# Patient Record
Sex: MALE | Race: WHITE | HISPANIC OR LATINO | URBAN - METROPOLITAN AREA
[De-identification: names, ages, dates, MRNs, and addresses within clinical notes are randomized per-mention and may not be internally consistent; named-entity substitution may affect disease eponyms.]

---

## 2021-02-08 ENCOUNTER — OFFICE VISIT (OUTPATIENT)
Dept: URBAN - METROPOLITAN AREA CLINIC 63 | Facility: CLINIC | Age: 36
End: 2021-02-08

## 2021-04-15 ENCOUNTER — OFFICE VISIT (OUTPATIENT)
Dept: URBAN - METROPOLITAN AREA SURGERY CENTER 4 | Facility: SURGERY CENTER | Age: 36
End: 2021-04-15

## 2021-04-28 ENCOUNTER — OFFICE VISIT (OUTPATIENT)
Dept: URBAN - METROPOLITAN AREA CLINIC 63 | Facility: CLINIC | Age: 36
End: 2021-04-28

## 2021-06-24 ENCOUNTER — OFFICE VISIT (OUTPATIENT)
Dept: URBAN - METROPOLITAN AREA SURGERY CENTER 4 | Facility: SURGERY CENTER | Age: 36
End: 2021-06-24

## 2021-06-28 LAB — PATHOLOGY (INDENTED REPORT): (no result)

## 2021-07-16 ENCOUNTER — OFFICE VISIT (OUTPATIENT)
Dept: URBAN - METROPOLITAN AREA CLINIC 63 | Facility: CLINIC | Age: 36
End: 2021-07-16

## 2021-08-18 ENCOUNTER — OFFICE VISIT (OUTPATIENT)
Dept: URBAN - METROPOLITAN AREA CLINIC 60 | Facility: CLINIC | Age: 36
End: 2021-08-18

## 2021-10-06 ENCOUNTER — OFFICE VISIT (OUTPATIENT)
Dept: URBAN - METROPOLITAN AREA CLINIC 63 | Facility: CLINIC | Age: 36
End: 2021-10-06

## 2021-10-22 ENCOUNTER — OFFICE VISIT (OUTPATIENT)
Dept: URBAN - METROPOLITAN AREA SURGERY CENTER 4 | Facility: SURGERY CENTER | Age: 36
End: 2021-10-22

## 2022-01-21 ENCOUNTER — OFFICE VISIT (OUTPATIENT)
Dept: URBAN - METROPOLITAN AREA SURGERY CENTER 4 | Facility: SURGERY CENTER | Age: 37
End: 2022-01-21

## 2022-04-08 ENCOUNTER — OFFICE VISIT (OUTPATIENT)
Dept: URBAN - METROPOLITAN AREA SURGERY CENTER 4 | Facility: SURGERY CENTER | Age: 37
End: 2022-04-08

## 2022-06-23 ENCOUNTER — OFFICE VISIT (OUTPATIENT)
Dept: URBAN - METROPOLITAN AREA SURGERY CENTER 4 | Facility: SURGERY CENTER | Age: 37
End: 2022-06-23

## 2022-06-24 ENCOUNTER — OFFICE VISIT (OUTPATIENT)
Dept: URBAN - METROPOLITAN AREA SURGERY CENTER 4 | Facility: SURGERY CENTER | Age: 37
End: 2022-06-24

## 2022-07-09 ENCOUNTER — TELEPHONE ENCOUNTER (OUTPATIENT)
Dept: URBAN - METROPOLITAN AREA CLINIC 121 | Facility: CLINIC | Age: 37
End: 2022-07-09

## 2022-07-09 RX ORDER — LEVOTHYROXINE SODIUM 50 UG/1
TABLET ORAL ONCE A DAY
Refills: 0 | OUTPATIENT
Start: 2020-12-12 | End: 2021-10-06

## 2022-07-10 ENCOUNTER — TELEPHONE ENCOUNTER (OUTPATIENT)
Dept: URBAN - METROPOLITAN AREA CLINIC 121 | Facility: CLINIC | Age: 37
End: 2022-07-10

## 2022-07-10 RX ORDER — LEVOTHYROXINE SODIUM 50 UG/1
TABLET ORAL ONCE A DAY
Refills: 0 | Status: ACTIVE | COMMUNITY
Start: 2021-10-06

## 2022-07-10 RX ORDER — MESALAMINE 1000 MG/1
ONCE A DAY SUPPOSITORY RECTAL ONCE A DAY
Refills: 0 | Status: ACTIVE | COMMUNITY
Start: 2021-07-16

## 2022-07-29 ENCOUNTER — TELEPHONE ENCOUNTER (OUTPATIENT)
Dept: URBAN - METROPOLITAN AREA CLINIC 63 | Facility: CLINIC | Age: 37
End: 2022-07-29

## 2022-09-27 ENCOUNTER — OFFICE VISIT (OUTPATIENT)
Dept: URBAN - METROPOLITAN AREA CLINIC 63 | Facility: CLINIC | Age: 37
End: 2022-09-27

## 2022-09-27 RX ORDER — MESALAMINE 1000 MG/1
ONCE A DAY SUPPOSITORY RECTAL ONCE A DAY
Refills: 0 | Status: ACTIVE | COMMUNITY
Start: 2021-07-16

## 2022-09-27 RX ORDER — LEVOTHYROXINE SODIUM 50 UG/1
TABLET ORAL ONCE A DAY
Refills: 0 | Status: ACTIVE | COMMUNITY
Start: 2021-10-06

## 2022-12-06 ENCOUNTER — TELEPHONE ENCOUNTER (OUTPATIENT)
Dept: URBAN - METROPOLITAN AREA CLINIC 63 | Facility: CLINIC | Age: 37
End: 2022-12-06

## 2022-12-07 ENCOUNTER — LAB OUTSIDE AN ENCOUNTER (OUTPATIENT)
Dept: URBAN - METROPOLITAN AREA CLINIC 63 | Facility: CLINIC | Age: 37
End: 2022-12-07

## 2022-12-07 ENCOUNTER — WEB ENCOUNTER (OUTPATIENT)
Dept: URBAN - METROPOLITAN AREA CLINIC 63 | Facility: CLINIC | Age: 37
End: 2022-12-07

## 2022-12-07 ENCOUNTER — OFFICE VISIT (OUTPATIENT)
Dept: URBAN - METROPOLITAN AREA CLINIC 63 | Facility: CLINIC | Age: 37
End: 2022-12-07
Payer: COMMERCIAL

## 2022-12-07 VITALS
SYSTOLIC BLOOD PRESSURE: 124 MMHG | OXYGEN SATURATION: 97 % | BODY MASS INDEX: 38.89 KG/M2 | HEIGHT: 67 IN | TEMPERATURE: 97.2 F | WEIGHT: 247.8 LBS | DIASTOLIC BLOOD PRESSURE: 82 MMHG | HEART RATE: 78 BPM

## 2022-12-07 DIAGNOSIS — D12.6 BENIGN NEOPLASM OF COLON, UNSPECIFIED: ICD-10-CM

## 2022-12-07 DIAGNOSIS — E66.9 OBESITY, UNSPECIFIED: ICD-10-CM

## 2022-12-07 DIAGNOSIS — K76.0 FATTY LIVER: ICD-10-CM

## 2022-12-07 PROBLEM — 197321007: Status: ACTIVE | Noted: 2022-12-07

## 2022-12-07 PROCEDURE — 99213 OFFICE O/P EST LOW 20 MIN: CPT | Performed by: INTERNAL MEDICINE

## 2022-12-07 RX ORDER — LEVOTHYROXINE SODIUM 50 UG/1
TABLET ORAL ONCE A DAY
Refills: 0 | Status: ACTIVE | COMMUNITY
Start: 2021-10-06

## 2022-12-07 NOTE — HPI-HPI
Patient with rectal bleeding and irritation of the hemorrhoids, does not complains of change in his stool. is red blood, denies rectal pain. Patient works in the [employer]. Will start topical treatment will consider flex sigmoidoscopy. Symptoms worse since last summer.   7/21 Patient sigmoidoscopy shows evidence of a 5 mm tubular adenoma polyp in the rectosigmoid colon, mild diverticular disease of the descending colon, and internal hemorrhoids, and congested and erythematous mucosa in the sigmoid colon.  Biopsy results shows evidence of no evidence of microscopic colitis.  Patient here today in good general state.  Still complaining of rectal bleeding at times and rectal discomfort. Plan: Patient will start on Canasa suppository once at night.  Will increase fluid and fiber intake avoid constipation. 10/21: Patient with treatment with canasa with improvement of the symptoms, is on treatment doing well. Likely topical treatment is improved the rectal symptoms. With adenoma will do colonoscopy to evaluate the rest of the colon, patient would like to wait few months. Will follow as needed basis. 12/22: Patient had colonoscopy on JUne 2022 with evidence of transverse colon polyps, Tubular adenoma. Will plan a surveilance colonoscopy in 3 years. Patient  is doing better, with rare hemorrhoids irritation.  Will continue treatment topical for hemorrhoids and increase fiber and  water. Will follow as needed basis and will do try to decrease weight.

## 2023-03-15 ENCOUNTER — OFFICE VISIT (OUTPATIENT)
Dept: URBAN - METROPOLITAN AREA CLINIC 63 | Facility: CLINIC | Age: 38
End: 2023-03-15

## 2023-12-15 ENCOUNTER — TELEPHONE ENCOUNTER (OUTPATIENT)
Dept: URBAN - METROPOLITAN AREA CLINIC 63 | Facility: CLINIC | Age: 38
End: 2023-12-15

## 2023-12-15 ENCOUNTER — LAB OUTSIDE AN ENCOUNTER (OUTPATIENT)
Dept: URBAN - METROPOLITAN AREA CLINIC 63 | Facility: CLINIC | Age: 38
End: 2023-12-15

## 2024-04-17 ENCOUNTER — OV EP (OUTPATIENT)
Dept: URBAN - METROPOLITAN AREA CLINIC 63 | Facility: CLINIC | Age: 39
End: 2024-04-17
Payer: COMMERCIAL

## 2024-04-17 ENCOUNTER — OV EP (OUTPATIENT)
Dept: URBAN - METROPOLITAN AREA CLINIC 63 | Facility: CLINIC | Age: 39
End: 2024-04-17

## 2024-04-17 VITALS
HEIGHT: 67 IN | SYSTOLIC BLOOD PRESSURE: 140 MMHG | HEART RATE: 90 BPM | DIASTOLIC BLOOD PRESSURE: 90 MMHG | BODY MASS INDEX: 37.35 KG/M2 | OXYGEN SATURATION: 96 % | WEIGHT: 238 LBS | TEMPERATURE: 97.7 F

## 2024-04-17 DIAGNOSIS — E78.5 DYSLIPIDEMIA: ICD-10-CM

## 2024-04-17 DIAGNOSIS — E66.9 OBESITY, UNSPECIFIED: ICD-10-CM

## 2024-04-17 DIAGNOSIS — K76.0 FATTY LIVER: ICD-10-CM

## 2024-04-17 DIAGNOSIS — D12.6 BENIGN NEOPLASM OF COLON, UNSPECIFIED: ICD-10-CM

## 2024-04-17 PROCEDURE — 99214 OFFICE O/P EST MOD 30 MIN: CPT | Performed by: INTERNAL MEDICINE

## 2024-04-17 RX ORDER — LEVOTHYROXINE SODIUM 50 UG/1
TABLET ORAL ONCE A DAY
Refills: 0 | Status: ACTIVE | COMMUNITY
Start: 2021-10-06

## 2024-04-17 NOTE — HPI-HPI
Patient with rectal bleeding and irritation of the hemorrhoids, does not complains of change in his stool. is red blood, denies rectal pain. Patient works in the [employer]. Will start topical treatment will consider flex sigmoidoscopy. Symptoms worse since last summer.   7/21 Patient sigmoidoscopy shows evidence of a 5 mm tubular adenoma polyp in the rectosigmoid colon, mild diverticular disease of the descending colon, and internal hemorrhoids, and congested and erythematous mucosa in the sigmoid colon.  Biopsy results shows evidence of no evidence of microscopic colitis.  Patient here today in good general state.  Still complaining of rectal bleeding at times and rectal discomfort. Plan: Patient will start on Canasa suppository once at night.  Will increase fluid and fiber intake avoid constipation. 10/21: Patient with treatment with canasa with improvement of the symptoms, is on treatment doing well. Likely topical treatment is improved the rectal symptoms. With adenoma will do colonoscopy to evaluate the rest of the colon, patient would like to wait few months. Will follow as needed basis. 12/22: Patient had colonoscopy on JUne 2022 with evidence of transverse colon polyps, Tubular adenoma. Will plan a surveilance colonoscopy in 3 years. Patient  is doing better, with rare hemorrhoids irritation.  Will continue treatment topical for hemorrhoids and increase fiber and  water. Will follow as needed basis and will do try to decrease weight. 4/24: Patient with mild elevation of ALT other LFT are normal. Patient with elevation of triglycerides and decreased HDL and mild increase of LDL.  Patient obese.  FibroScan show severe steatosis of the liver with no fibrosis.  Will need to do a strict diet, exercise and weight loss.  Patient understand and will try to do diet and exercise and we will follow him in 4-month at that time we will repeat labs and will consider if is indicated treatment for his elevated triglycerides, patient stated that have familiar hypertriglyceridemia.  Patient also complaining of occasional irritation of hemorrhoids that is being treated with fiber water and topical treatment with resolution of the flares.  Recommend to continue treatment and will follow him in 4 months.

## 2024-04-24 ENCOUNTER — OV EP (OUTPATIENT)
Dept: URBAN - METROPOLITAN AREA CLINIC 63 | Facility: CLINIC | Age: 39
End: 2024-04-24

## 2024-09-18 ENCOUNTER — LAB OUTSIDE AN ENCOUNTER (OUTPATIENT)
Dept: URBAN - METROPOLITAN AREA CLINIC 63 | Facility: CLINIC | Age: 39
End: 2024-09-18

## 2024-09-18 ENCOUNTER — OFFICE VISIT (OUTPATIENT)
Dept: URBAN - METROPOLITAN AREA CLINIC 63 | Facility: CLINIC | Age: 39
End: 2024-09-18
Payer: COMMERCIAL

## 2024-09-18 ENCOUNTER — DASHBOARD ENCOUNTERS (OUTPATIENT)
Age: 39
End: 2024-09-18

## 2024-09-18 VITALS
WEIGHT: 238 LBS | BODY MASS INDEX: 37.35 KG/M2 | TEMPERATURE: 97.8 F | DIASTOLIC BLOOD PRESSURE: 80 MMHG | HEART RATE: 67 BPM | SYSTOLIC BLOOD PRESSURE: 120 MMHG | OXYGEN SATURATION: 98 % | HEIGHT: 67 IN

## 2024-09-18 DIAGNOSIS — E78.5 DYSLIPIDEMIA: ICD-10-CM

## 2024-09-18 DIAGNOSIS — E66.9 OBESITY, UNSPECIFIED: ICD-10-CM

## 2024-09-18 DIAGNOSIS — K76.0 FATTY LIVER: ICD-10-CM

## 2024-09-18 DIAGNOSIS — D12.6 BENIGN NEOPLASM OF COLON, UNSPECIFIED: ICD-10-CM

## 2024-09-18 PROCEDURE — 99214 OFFICE O/P EST MOD 30 MIN: CPT | Performed by: INTERNAL MEDICINE

## 2024-09-18 RX ORDER — LEVOTHYROXINE SODIUM 50 UG/1
TABLET ORAL ONCE A DAY
Refills: 0 | Status: ACTIVE | COMMUNITY
Start: 2021-10-06

## 2024-09-18 NOTE — HPI-HPI
Patient with rectal bleeding and irritation of the hemorrhoids, does not complains of change in his stool. is red blood, denies rectal pain. Patient works in the [employer]. Will start topical treatment will consider flex sigmoidoscopy. Symptoms worse since last summer.   7/21 Patient sigmoidoscopy shows evidence of a 5 mm tubular adenoma polyp in the rectosigmoid colon, mild diverticular disease of the descending colon, and internal hemorrhoids, and congested and erythematous mucosa in the sigmoid colon.  Biopsy results shows evidence of no evidence of microscopic colitis.  Patient here today in good general state.  Still complaining of rectal bleeding at times and rectal discomfort. Plan: Patient will start on Canasa suppository once at night.  Will increase fluid and fiber intake avoid constipation. 10/21: Patient with treatment with canasa with improvement of the symptoms, is on treatment doing well. Likely topical treatment is improved the rectal symptoms. With adenoma will do colonoscopy to evaluate the rest of the colon, patient would like to wait few months. Will follow as needed basis. 12/22: Patient had colonoscopy on JUne 2022 with evidence of transverse colon polyps, Tubular adenoma. Will plan a surveilance colonoscopy in 3 years. Patient  is doing better, with rare hemorrhoids irritation.  Will continue treatment topical for hemorrhoids and increase fiber and  water. Will follow as needed basis and will do try to decrease weight. 4/24: Patient with mild elevation of ALT other LFT are normal. Patient with elevation of triglycerides and decreased HDL and mild increase of LDL.  Patient obese.  FibroScan show severe steatosis of the liver with no fibrosis.  Will need to do a strict diet, exercise and weight loss.  Patient understand and will try to do diet and exercise and we will follow him in 4-month at that time we will repeat labs and will consider if is indicated treatment for his elevated triglycerides, patient stated that have familiar hypertriglyceridemia.  Patient also complaining of occasional irritation of hemorrhoids that is being treated with fiber water and topical treatment with resolution of the flares.  Recommend to continue treatment and will follow him in 4 months. 9/24: Patient with obesity, labs showed improvement of TG but stil elevated 275. with low HDL. Patient is doing well with normal LFT, CMP. Will follow in 6 months. Wih personal h/o fatty liver will do fibroscan  and will follow in 4 months.

## 2025-01-23 ENCOUNTER — TELEPHONE ENCOUNTER (OUTPATIENT)
Dept: URBAN - METROPOLITAN AREA CLINIC 63 | Facility: CLINIC | Age: 40
End: 2025-01-23

## 2025-01-27 ENCOUNTER — OFFICE VISIT (OUTPATIENT)
Dept: URBAN - METROPOLITAN AREA CLINIC 63 | Facility: CLINIC | Age: 40
End: 2025-01-27
Payer: COMMERCIAL

## 2025-01-27 ENCOUNTER — LAB OUTSIDE AN ENCOUNTER (OUTPATIENT)
Dept: URBAN - METROPOLITAN AREA CLINIC 63 | Facility: CLINIC | Age: 40
End: 2025-01-27

## 2025-01-27 VITALS
WEIGHT: 238 LBS | TEMPERATURE: 97.8 F | HEART RATE: 90 BPM | OXYGEN SATURATION: 97 % | DIASTOLIC BLOOD PRESSURE: 70 MMHG | HEIGHT: 67 IN | BODY MASS INDEX: 37.35 KG/M2 | SYSTOLIC BLOOD PRESSURE: 130 MMHG

## 2025-01-27 DIAGNOSIS — K76.0 FATTY LIVER: ICD-10-CM

## 2025-01-27 DIAGNOSIS — E78.5 DYSLIPIDEMIA: ICD-10-CM

## 2025-01-27 DIAGNOSIS — D12.6 ADENOMA DETERMINED BY COLORECTAL BIOPSY: ICD-10-CM

## 2025-01-27 DIAGNOSIS — E66.9 ADULT-ONSET OBESITY: ICD-10-CM

## 2025-01-27 PROCEDURE — 99214 OFFICE O/P EST MOD 30 MIN: CPT | Performed by: INTERNAL MEDICINE

## 2025-01-27 RX ORDER — LEVOTHYROXINE SODIUM 50 UG/1
TABLET ORAL ONCE A DAY
Refills: 0 | Status: ACTIVE | COMMUNITY
Start: 2021-10-06

## 2025-01-27 NOTE — HPI-HPI
Patient with rectal bleeding and irritation of the hemorrhoids, does not complains of change in his stool. is red blood, denies rectal pain. Patient works in the [employer]. Will start topical treatment will consider flex sigmoidoscopy. Symptoms worse since last summer.   7/21 Patient sigmoidoscopy shows evidence of a 5 mm tubular adenoma polyp in the rectosigmoid colon, mild diverticular disease of the descending colon, and internal hemorrhoids, and congested and erythematous mucosa in the sigmoid colon.  Biopsy results shows evidence of no evidence of microscopic colitis.  Patient here today in good general state.  Still complaining of rectal bleeding at times and rectal discomfort. Plan: Patient will start on Canasa suppository once at night.  Will increase fluid and fiber intake avoid constipation. 10/21: Patient with treatment with canasa with improvement of the symptoms, is on treatment doing well. Likely topical treatment is improved the rectal symptoms. With adenoma will do colonoscopy to evaluate the rest of the colon, patient would like to wait few months. Will follow as needed basis. 12/22: Patient had colonoscopy on JUne 2022 with evidence of transverse colon polyps, Tubular adenoma. Will plan a surveilance colonoscopy in 3 years. Patient  is doing better, with rare hemorrhoids irritation.  Will continue treatment topical for hemorrhoids and increase fiber and  water. Will follow as needed basis and will do try to decrease weight. 4/24: Patient with mild elevation of ALT other LFT are normal. Patient with elevation of triglycerides and decreased HDL and mild increase of LDL.  Patient obese.  FibroScan show severe steatosis of the liver with no fibrosis.  Will need to do a strict diet, exercise and weight loss.  Patient understand and will try to do diet and exercise and we will follow him in 4-month at that time we will repeat labs and will consider if is indicated treatment for his elevated triglycerides, patient stated that have familiar hypertriglyceridemia.  Patient also complaining of occasional irritation of hemorrhoids that is being treated with fiber water and topical treatment with resolution of the flares.  Recommend to continue treatment and will follow him in 4 months. 9/24: Patient with obesity, labs showed improvement of TG but stil elevated 275. with low HDL. Patient is doing well with normal LFT, CMP. Will follow in 6 months. Wih personal h/o fatty liver will do fibroscan  and will follow in 4 months. 1/25: Patient with obesity, dislipidemia and fatty liver. Is under better control. plan is to do fibroscan to evaluate steatosis that was severe on last test (S3) without fibrosis. Will continue diet, exercise weight loss and will check labs to evaluate liver function.

## 2025-05-01 ENCOUNTER — TELEPHONE ENCOUNTER (OUTPATIENT)
Dept: URBAN - METROPOLITAN AREA CLINIC 63 | Facility: CLINIC | Age: 40
End: 2025-05-01

## 2025-05-02 ENCOUNTER — OFFICE VISIT (OUTPATIENT)
Dept: URBAN - METROPOLITAN AREA CLINIC 61 | Facility: CLINIC | Age: 40
End: 2025-05-02
Payer: COMMERCIAL

## 2025-05-02 DIAGNOSIS — K76.0 FATTY LIVER: ICD-10-CM

## 2025-05-02 PROCEDURE — 76981 USE PARENCHYMA: CPT | Performed by: INTERNAL MEDICINE

## 2025-05-02 RX ORDER — LEVOTHYROXINE SODIUM 50 UG/1
TABLET ORAL ONCE A DAY
Refills: 0 | Status: ACTIVE | COMMUNITY
Start: 2021-10-06

## 2025-05-29 ENCOUNTER — TELEPHONE ENCOUNTER (OUTPATIENT)
Dept: URBAN - METROPOLITAN AREA CLINIC 63 | Facility: CLINIC | Age: 40
End: 2025-05-29

## 2025-05-30 ENCOUNTER — OFFICE VISIT (OUTPATIENT)
Dept: URBAN - METROPOLITAN AREA SURGERY CENTER 4 | Facility: SURGERY CENTER | Age: 40
End: 2025-05-30

## 2025-05-30 RX ORDER — LEVOTHYROXINE SODIUM 50 UG/1
TABLET ORAL ONCE A DAY
Refills: 0 | Status: ACTIVE | COMMUNITY
Start: 2021-10-06

## 2025-06-16 ENCOUNTER — OFFICE VISIT (OUTPATIENT)
Dept: URBAN - METROPOLITAN AREA CLINIC 63 | Facility: CLINIC | Age: 40
End: 2025-06-16
Payer: COMMERCIAL

## 2025-06-16 DIAGNOSIS — E66.9 ADULT-ONSET OBESITY: ICD-10-CM

## 2025-06-16 DIAGNOSIS — K57.30 DVRTCLOS OF LG INT W/O PERFORATION OR ABSCESS W/O BLEEDING: ICD-10-CM

## 2025-06-16 DIAGNOSIS — E78.5 DYSLIPIDEMIA: ICD-10-CM

## 2025-06-16 DIAGNOSIS — K76.0 FATTY LIVER: ICD-10-CM

## 2025-06-16 PROCEDURE — 99214 OFFICE O/P EST MOD 30 MIN: CPT | Performed by: INTERNAL MEDICINE

## 2025-06-16 RX ORDER — LEVOTHYROXINE SODIUM 50 UG/1
TABLET ORAL ONCE A DAY
Refills: 0 | Status: ACTIVE | COMMUNITY
Start: 2021-10-06

## 2025-06-16 NOTE — HPI-TODAY'S VISIT:
6/25: Patient is being seen with previous history of steatosis of the liver with known fibrosis, obesity.  FibroScan done in May of this year show steatosis grade of 2 and a fibrosis stage of 0 no fibrosis patient will need to continue doing ultrasound weight loss and diet.  With this finding we can repeat a FibroScan in a year from now.  Patient with elevated triglycerides will need to continue diet and exercise and will monitor labs in the next visit; colonoscopy done on the 30th of  May of 2025 with evidence of diverticulosis, and internal hemorrhoids, with her previous history we will repeat a colonoscopy in 5 years.  His labs done in March 2025 showed again elevated triglyceride of 262 with normal LFTs.  Will consider repeating labs in 6 months patient will benefit of treatment for triglyceride elevation.

## 2025-06-20 ENCOUNTER — OFFICE VISIT (OUTPATIENT)
Dept: URBAN - METROPOLITAN AREA SURGERY CENTER 4 | Facility: SURGERY CENTER | Age: 40
End: 2025-06-20

## 2025-07-28 ENCOUNTER — OFFICE VISIT (OUTPATIENT)
Dept: URBAN - METROPOLITAN AREA CLINIC 63 | Facility: CLINIC | Age: 40
End: 2025-07-28